# Patient Record
Sex: FEMALE | Race: WHITE | HISPANIC OR LATINO | Employment: FULL TIME | URBAN - METROPOLITAN AREA
[De-identification: names, ages, dates, MRNs, and addresses within clinical notes are randomized per-mention and may not be internally consistent; named-entity substitution may affect disease eponyms.]

---

## 2024-01-02 ENCOUNTER — APPOINTMENT (OUTPATIENT)
Dept: WOMENS IMAGING | Facility: HOSPITAL | Age: 34
End: 2024-01-02
Payer: MEDICAID

## 2024-01-02 PROCEDURE — 77066 DX MAMMO INCL CAD BI: CPT | Performed by: RADIOLOGY

## 2024-01-02 PROCEDURE — 76642 ULTRASOUND BREAST LIMITED: CPT | Performed by: RADIOLOGY

## 2024-01-02 PROCEDURE — G0279 TOMOSYNTHESIS, MAMMO: HCPCS | Performed by: RADIOLOGY

## 2024-01-02 PROCEDURE — 77062 BREAST TOMOSYNTHESIS BI: CPT | Performed by: RADIOLOGY

## 2024-01-12 DIAGNOSIS — N64.4 BREAST PAIN, LEFT: ICD-10-CM

## 2024-01-15 ENCOUNTER — LAB REQUISITION (OUTPATIENT)
Dept: LAB | Facility: HOSPITAL | Age: 34
End: 2024-01-15
Payer: COMMERCIAL

## 2024-01-15 ENCOUNTER — APPOINTMENT (OUTPATIENT)
Dept: WOMENS IMAGING | Facility: HOSPITAL | Age: 34
End: 2024-01-15
Payer: COMMERCIAL

## 2024-01-15 DIAGNOSIS — N63.0 UNSPECIFIED LUMP IN UNSPECIFIED BREAST: ICD-10-CM

## 2024-01-15 PROCEDURE — 88305 TISSUE EXAM BY PATHOLOGIST: CPT | Performed by: OBSTETRICS & GYNECOLOGY

## 2024-01-15 PROCEDURE — A4648 IMPLANTABLE TISSUE MARKER: HCPCS | Performed by: RADIOLOGY

## 2024-01-15 PROCEDURE — 19083 BX BREAST 1ST LESION US IMAG: CPT | Performed by: RADIOLOGY

## 2024-01-16 DIAGNOSIS — N63.14 MASS OF LOWER INNER QUADRANT OF RIGHT BREAST: ICD-10-CM

## 2024-01-17 ENCOUNTER — TELEPHONE (OUTPATIENT)
Dept: OBSTETRICS AND GYNECOLOGY | Facility: CLINIC | Age: 34
End: 2024-01-17

## 2024-01-18 LAB
DX PRELIMINARY: NORMAL
LAB AP CASE REPORT: NORMAL
PATH REPORT.FINAL DX SPEC: NORMAL
PATH REPORT.GROSS SPEC: NORMAL

## 2024-01-22 ENCOUNTER — TELEPHONE (OUTPATIENT)
Dept: OBSTETRICS AND GYNECOLOGY | Facility: CLINIC | Age: 34
End: 2024-01-22

## 2024-03-27 ENCOUNTER — OFFICE VISIT (OUTPATIENT)
Dept: OBSTETRICS AND GYNECOLOGY | Facility: CLINIC | Age: 34
End: 2024-03-27
Payer: COMMERCIAL

## 2024-03-27 VITALS
DIASTOLIC BLOOD PRESSURE: 72 MMHG | HEIGHT: 71 IN | SYSTOLIC BLOOD PRESSURE: 108 MMHG | WEIGHT: 193 LBS | BODY MASS INDEX: 27.02 KG/M2

## 2024-03-27 DIAGNOSIS — Z11.3 SCREEN FOR SEXUALLY TRANSMITTED DISEASES: Primary | ICD-10-CM

## 2024-03-27 DIAGNOSIS — N75.0 CYST OF LEFT BARTHOLIN'S GLAND: ICD-10-CM

## 2024-03-27 DIAGNOSIS — Z20.2 EXPOSURE TO STD: ICD-10-CM

## 2024-03-27 RX ORDER — CEPHALEXIN 500 MG/1
1 CAPSULE ORAL EVERY 6 HOURS
COMMUNITY
Start: 2024-03-16

## 2024-03-27 NOTE — PROGRESS NOTES
"Chief Complaint  Gynecologic Exam (Patient is here for consult about er visit (3/10 bartholin cyst))    Entirety of today's encounter including patient interview, exam, and counseling performed with the aid of a medical  via the telephone.     Subjective        Liana Perez presents to [facility]  History of Present Illness  Patient is here for follow-up from an emergency room visit.  She previously had seen my partner Dr. Rushing, but wished to switch care to me.  She apparently was seen at the [facility] on 3/10/2024.  We cannot view this visit in [facility] everywhere because the patient was registered under a different name.  We have reached out to [facility] to try to get records faxed to us.  She does have her visit summary from the encounter.  All I can see on the summary is that she was diagnosed with a Bartholin cyst and was given a prescription for cephalexin.  Patient reports that she had gone to the ER because she was having left-sided vulvar pain and swelling.  In the ER she says she was told that she had a Bartholin's abscess.  It was drained in the ER and she was started on cephalexin for 10 days.  The patient reports that symptoms have resolved in the interim.  She says she has never had issues like this in the past.    Patient also has concerns about a previous diagnosis of chlamydia.  She had reported that she tested positive for chlamydia in December 2023 \"at [facility]\".  She states she was treated at that time.  She reports her boyfriend was also treated but never got tested.  She wants more information about chlamydia infections.  Of note, I do not see any tests from the [facility] system; however, she does have testing done at the [facility] system.  It looks like her test for chlamydia was positive in August 2023.  Prior to that it had been negative and September 2023.  She also had a repeat test in October 2023 after being treated for " chlamydia, and that test was positive.  Patient states that she had been confused on the timing of the testing, and this was the only time that she had tested positive.    Menstrual History:  OB History          1    Para   1    Term   1            AB        Living   1         SAB        IAB        Ectopic        Molar        Multiple        Live Births   1               Patient's last menstrual period was 03/15/2024 (exact date).       Past Medical History:   Diagnosis Date    Chlamydia 2023     History reviewed. No pertinent surgical history.    Family History   Problem Relation Age of Onset    No Known Problems Father     No Known Problems Mother     No Known Problems Brother     No Known Problems Sister     Breast cancer Neg Hx     Colon cancer Neg Hx     Ovarian cancer Neg Hx     Uterine cancer Neg Hx      Social History     Tobacco Use    Smoking status: Never   Vaping Use    Vaping status: Never Used   Substance Use Topics    Alcohol use: Never    Drug use: Never     Current Outpatient Medications on File Prior to Visit   Medication Sig    cephalexin (KEFLEX) 500 MG capsule Take 1 capsule by mouth Every 6 (Six) Hours. (Patient not taking: Reported on 3/27/2024)     No current facility-administered medications on file prior to visit.     No Known Allergies    Review of systems:  Constitutional: No fevers, chills, sweats   Eye: No recent visual problems, denies blurry vision   HEENT: No ear pain, nasal congestion, sore throat, voice changes   Respiratory: No shortness of breath, cough, pain on breathing   Cardiovascular: No Chest pain, palpitations   Gastrointestinal: No nausea, vomiting, diarrhea, constipation   Genitourinary: No hematuria, dysuria, lesions on genitalia   Hema/Lymph: Negative for bruising, no edema   Endocrine: Negative for excessive thirst, excessive hunger, heat or cold intolerance   Musculoskeletal: No joint pain, muscle pain, decreased range of motion   Integumentary: No  "rash, pruritus, abrasions, lesions   Neurologic: No weakness, numbness, headaches   Psychiatric: No anxiety, depression, mood changes       Objective   Vital Signs:  /72   Ht 180.3 cm (70.98\")   Wt 87.5 kg (193 lb)   BMI 26.93 kg/m²   Estimated body mass index is 26.93 kg/m² as calculated from the following:    Height as of this encounter: 180.3 cm (70.98\").    Weight as of this encounter: 87.5 kg (193 lb).             Physical Exam   Gen: No acute distress, awake and oriented times three  Abdomen: soft, nontender, no masses or hernia, non distended, normoactive bowel sounds  Pelvic: Exam performed in the presence of a female chaperone  Patient has provided verbal consent to proceed with exam.  Normal external female genitalia, no lesions  At this time, there is no evidence of a Bartholin's gland cyst on either side.  Urethra: Normal meatus, no caruncle  Bladder: nontender  Vagina: No blood or discharge  Bimanual exam is deferred  External anal exam: Normal appearance, no lesions or hemorrhoids  Rectal: Deferred  Psych: Good judgement and insight, normal affect and mood    Result Review :    The following data was reviewed by: Indio Mo MD on 03/27/2024:    Data reviewed : Recent hospitalization notes er NOTES and lABS    10/11/2023:    Component  Ref Range & Units 5 mo ago Comments   CHLAMYDIA TRACHOMATIS RNA TMA, UROGENITAL (REFL)  NOT DETECTED NOT DETECTED    N gonorrhoeae, DNA Probe  NOT DETECTED NOT DETECTED      8/2/2023:  Component 7 mo ago   C. TRACHOMATIS DNA DETECTED   NEISSERIA GONORRHOEAE DNA NOT DETECTED     2/1/2023:  Component 1 yr ago   C. TRACHOMATIS DNA NOT DETECTED            Assessment and Plan     Diagnoses and all orders for this visit:    1. Screen for sexually transmitted diseases (Primary)  -     Chlamydia trachomatis, Neisseria gonorrhoeae, Trichomonas vaginalis, PCR - Swab, Cervix    2. Exposure to STD  -     Chlamydia trachomatis, Neisseria gonorrhoeae, Trichomonas " vaginalis, PCR - Swab, Cervix    I explained the nature of chlamydia infection to the patient.  We explained this is a sexually transmitted disease.  We discussed modes of transmission.  Safe sex practices encouraged.  Repeat testing encouraged today.  Recommended that her partner also be evaluated again to be sure that he is still negative.    3. Cyst of left Bartholin's gland - RESOLVED    No evidence of persistent Bartholin's gland cyst at this time.  We discussed the waxing/waning nature of the Bartholin's gland cyst.  Explained that at times may become larger than others.  We discussed signs of Bartholin's gland abscess.  Explained more long-term therapy for Bartholin's gland cyst including procedures such as marsupialization.  At this time since the exam is negative I would not recommend any further therapy unless it becomes more problematic.  She verbalized understanding.         Follow Up     Return in about 9 months (around 12/27/2024) for Annual physical.  Patient was given instructions and counseling regarding her condition or for health maintenance advice. Please see specific information pulled into the AVS if appropriate.

## 2024-03-29 LAB
C TRACH RRNA SPEC QL NAA+PROBE: NEGATIVE
N GONORRHOEA RRNA SPEC QL NAA+PROBE: NEGATIVE
T VAGINALIS RRNA SPEC QL NAA+PROBE: NEGATIVE

## 2024-07-12 ENCOUNTER — TELEPHONE (OUTPATIENT)
Dept: OBSTETRICS AND GYNECOLOGY | Facility: CLINIC | Age: 34
End: 2024-07-12
Payer: COMMERCIAL

## 2024-07-12 NOTE — TELEPHONE ENCOUNTER
Pt worked with [facility] and scheduled her 6 mth follow up [facility] for 7/15/24 @ 1030am.

## 2024-07-15 ENCOUNTER — APPOINTMENT (OUTPATIENT)
Dept: WOMENS IMAGING | Facility: HOSPITAL | Age: 34
End: 2024-07-15
Payer: COMMERCIAL

## 2024-07-15 PROCEDURE — 76642 ULTRASOUND BREAST LIMITED: CPT | Performed by: RADIOLOGY

## 2024-07-17 DIAGNOSIS — D24.1 FIBROADENOMA OF RIGHT BREAST: ICD-10-CM

## 2024-07-18 DIAGNOSIS — D24.1 FIBROADENOMA OF RIGHT BREAST: Primary | ICD-10-CM

## 2024-07-18 DIAGNOSIS — Z12.31 SCREENING MAMMOGRAM FOR BREAST CANCER: Primary | ICD-10-CM

## 2024-07-22 ENCOUNTER — TELEPHONE (OUTPATIENT)
Dept: SURGERY | Facility: CLINIC | Age: 34
End: 2024-07-22
Payer: COMMERCIAL

## 2024-07-29 ENCOUNTER — TELEPHONE (OUTPATIENT)
Dept: SURGERY | Facility: CLINIC | Age: 34
End: 2024-07-29
Payer: COMMERCIAL

## 2024-07-29 NOTE — TELEPHONE ENCOUNTER
Called the pt to set up her new PT appointment with .    New PT appointment: 08/20/2024 at 09:15 AM    PT gave a verbal understanding of appt date, time, and location.     Appt reminder and new PT paperwork sent out on 07/29/2024.    MEN

## 2024-08-13 ENCOUNTER — TELEPHONE (OUTPATIENT)
Dept: SURGERY | Facility: CLINIC | Age: 34
End: 2024-08-13
Payer: COMMERCIAL

## 2024-08-13 NOTE — TELEPHONE ENCOUNTER
I called PT to rescheuled her new PT appt with Dr. Mireles.    New PT appt: 08/20/2024 at 01:00 PM    PT gave a verbal understanding of appt date, time, and location.    Appt reminder sent out 08/13/2024    MEN

## 2024-08-19 NOTE — H&P (VIEW-ONLY)
[facility]     Referring Provider: Noah Rushing,*     Chief complaint: breast mass     Subjective   HPI: Ms. Liana Perez is a 34 y.o. yo woman, seen at the request of Noah Rushing,*, for a new diagnosis of right breast fibroadenoma..      She notes the lesion has been present for approximately 1 year.  It has increased in size on her personal exam.  She underwent diagnostic imaging and biopsy which revealed her initial diagnosis of fibroadenoma.  Follow-up imaging shows grossly stable mass in the right lower inner quadrant of her breast.  She notes it is painful from time to time but notices it mostly when she has a cough.    She denies any prior history of abnormal mammograms or breast biopsies.     She reports she is otherwise healthy but takes a baby aspirin every day as recommended by her mother for circulation.     She denies any family history of breast or ovarian cancer.     She was by herself today in clinic.  The visit was assisted in person , [id number] [id number].         Review of Systems   Constitutional:  Positive for unexpected weight change (weight gain). Negative for appetite change, fatigue and fever.   HENT:  Negative.     Eyes: Negative.    Respiratory: Negative.  Negative for cough and shortness of breath.    Cardiovascular: Negative.    Gastrointestinal:  Positive for constipation and nausea. Negative for abdominal distention and diarrhea.   Endocrine: Negative.    Genitourinary:  Positive for pelvic pain.    Musculoskeletal:  Positive for arthralgias, back pain, flank pain, gait problem and myalgias.   Skin: Negative.    Neurological:  Positive for gait problem. Negative for dizziness, headaches and speech difficulty.   Hematological: Negative.    Psychiatric/Behavioral:  Negative for decreased concentration and sleep disturbance. The patient is nervous/anxious.        Medications:    Current Outpatient Medications:     aspirin 81 MG chewable tablet, Chew 1  "tablet Daily., Disp: , Rfl:     cephalexin (KEFLEX) 500 MG capsule, Take 1 capsule by mouth Every 6 (Six) Hours. (Patient not taking: Reported on 3/27/2024), Disp: , Rfl:     Allergies:  No Known Allergies    Medical history:  Past Medical History:   Diagnosis Date    Chlamydia 2023       Surgical History:  History reviewed. No pertinent surgical history.    Family History:  Family History   Problem Relation Age of Onset    No Known Problems Father     No Known Problems Mother     No Known Problems Brother     No Known Problems Sister     Breast cancer Neg Hx     Colon cancer Neg Hx     Ovarian cancer Neg Hx     Uterine cancer Neg Hx        Cancer Family History: Age of diagnosis/Age at death OR Age as of today  Breast Cancer: Denies  Ovarian Cancer: Denies  Pancreatic Cancer: Denies  Prostate Cancer: Denies  Colon Cancer: Denies  Lung Cancer: Denies    Social History:   Social History     Socioeconomic History    Marital status:    Tobacco Use    Smoking status: Never   Vaping Use    Vaping status: Never Used   Substance and Sexual Activity    Alcohol use: Yes     Comment: now and then, once a week    Drug use: Never    Sexual activity: Yes     Birth control/protection: Nexplanon       She lives with her , child, sister, niece.   She is employed as a [employer], works with [employer].       GYNECOLOGIC HISTORY:   . P: 1. AB:  0.  Last menstrual period: 2024  Age at menarche: 11  Age at first childbirth: 33   Lactation: 1 week  Age at menopause: N/A  Total years of oral contraceptive use: 1  Total years of hormone replacement therapy: 0      Objective   PHYSICAL EXAMINATION  This exam was chaperoned by: Millicent   /78   Pulse 81   Resp 18   Ht 180.3 cm (70.98\")   Wt 87.5 kg (193 lb)   SpO2 97%   BMI 26.93 kg/m²   ECOG 0 - Asymptomatic  General: NAD, well appearing  Psych: a&o x 3, normal mood and affect  Eyes: EOMI, no scleral icterus  ENMT: neck supple without masses or thyromegaly, mucus " membranes moist  Resp: normal effort  CV: RRR, no edema   GI: soft, NT, ND  MSK: normal gait, normal ROM in bilateral shoulders  Lymph nodes:  no cervical, supraclavicular or axillary lymphadenopathy  Breast: symmetric, small breast volume, minimal ptosis  Right: Approximately 1 cm deep to the skin and lower inner quadrant at approximately 430 o'clock mobile firm 2 cm oblong mass, no visible abnormalities on inspection while seated, with arms raised or hands on hips. No skin changes, or nipple abnormalities.  Left:  No visible abnormalities on inspection while seated, with arms raised or hands on hips. No masses, skin changes, or nipple abnormalities.      Right breast, in-office ultrasound: 430 o'clock approximately 45 cm from the nipple there is an oblong well-circumscribed mass approximately half a centimeter deep to the skin       Imaging - documented images below have been personally reviewed:  1-24 bilateral diagnostic mammogram  Breast are extremely dense  Finding 1 no radiographic abnormality in the region the pain in the left breast  Finding 2 isodense oval mass with circumscribed margins in the posterior one third 430 o'clock region the right breast mass correlates to palpable abnormality at the 430 o'clock region  Bilateral ultrasound  Finding 1 no sonographic correlate  Finding 2 ultrasound demonstrates an oval parallel hypoechoic mass with circumscribed margins measuring 23 x 12 x 23 mm.  This finding is most consistent with a fibroadenoma  Impression  Finding 1-no sonographic evidence of malignancy  Finding 2 mass in the right breast is suspicious biopsy is recommended  BI-RADS 4A    1/15/2024 right breast biopsy ultrasound-guided biopsy performed for the mass in the right breast at 430 o'clock.  Using a 14-gauge biopsy needle 5 cores were obtained.  A mini cork shaped tissue marker was placed in the biopsy site.  Specimen was placed in formalin postprocedure mammogram shows biopsy clip in satisfactory  position  Impression  Pathology shows fibroadenoma pathology is concordant.  Recommend follow-up in 6 months with ultrasound    7/15/2024 right breast ultrasound  Follow-up exam for the oval mass in the right breast 430 o'clock seen 1-24, on present exam there is an oval parallel hypoechoic mass with circumscribed margins measuring 23 x 12 x 23 mm.  No significant change from prior exam.  This finding represents a biopsy-proven fibroadenoma  Impression mass in the right breast is benign negative biopsy-proven fibroadenoma is unchanged if patient desires excision for comfort surgical consultation may be considered.  BI-RADS 2    Pathology:  1/15/24  1. Right Breast, 4:30, Ultrasound-Guided Core Needle Biopsy for a Mass:               A. Fragments of fibroadenoma.    Assessment & Plan   Assessment:  34 y.o. F with a new diagnosis of painful, right breast fibroadenoma.     Discussion:  A right breast fibroadenoma discovered by exam for palpable mass. Discussed with patient at length the diagnosis of fibroadenoma, the clinical implications, and the clinical management of the process. Noted that this lesion is benign, and indications for surgical resection would include increasing size, symptoms, or concerning findings on surveillance imaging. Suspicious findings on imaging, greater than 3 cm or growing excision may be indicated as it can reviel a phylloides tumor. Discussed that surveillance imaging would be short term, strictly to ensure that there are no increases in size or suspicious changes. Noted that there is no increased risk of future malignancy linked to the diagnosis of fibroadenoma. Patient appeared to understand and all questions were answered.       Plan:    - OR for excisional biopsy of right breast fibroadenoma due to pain        Zenaida Mireles MD  Breast Surgical Oncology    I spent 60 minutes caring for Liana on this date of service. This time includes time spent by me in the following activities:  preparing for the visit, reviewing tests, obtaining and/or reviewing a separately obtained history, performing a medically appropriate examination and/or evaluation , counseling and educating the patient/family/caregiver, ordering medications, tests, or procedures, referring and communicating with other health care professionals , documenting information in the medical record, independently interpreting results and communicating that information with the patient/family/caregiver, and care coordination.      CC:  Noah Rushing,*

## 2024-08-19 NOTE — PROGRESS NOTES
[facility]     Referring Provider: Noah Rushing,*     Chief complaint: breast mass     Subjective   HPI: Ms. Liana Perez is a 34 y.o. yo woman, seen at the request of Noah Rushing,*, for a new diagnosis of right breast fibroadenoma..      She notes the lesion has been present for approximately 1 year.  It has increased in size on her personal exam.  She underwent diagnostic imaging and biopsy which revealed her initial diagnosis of fibroadenoma.  Follow-up imaging shows grossly stable mass in the right lower inner quadrant of her breast.  She notes it is painful from time to time but notices it mostly when she has a cough.    She denies any prior history of abnormal mammograms or breast biopsies.     She reports she is otherwise healthy but takes a baby aspirin every day as recommended by her mother for circulation.     She denies any family history of breast or ovarian cancer.     She was by herself today in clinic.  The visit was assisted in person , Jaspal [id number].         Review of Systems   Constitutional:  Positive for unexpected weight change (weight gain). Negative for appetite change, fatigue and fever.   HENT:  Negative.     Eyes: Negative.    Respiratory: Negative.  Negative for cough and shortness of breath.    Cardiovascular: Negative.    Gastrointestinal:  Positive for constipation and nausea. Negative for abdominal distention and diarrhea.   Endocrine: Negative.    Genitourinary:  Positive for pelvic pain.    Musculoskeletal:  Positive for arthralgias, back pain, flank pain, gait problem and myalgias.   Skin: Negative.    Neurological:  Positive for gait problem. Negative for dizziness, headaches and speech difficulty.   Hematological: Negative.    Psychiatric/Behavioral:  Negative for decreased concentration and sleep disturbance. The patient is nervous/anxious.        Medications:    Current Outpatient Medications:     aspirin 81 MG chewable tablet, Chew 1  "tablet Daily., Disp: , Rfl:     cephalexin (KEFLEX) 500 MG capsule, Take 1 capsule by mouth Every 6 (Six) Hours. (Patient not taking: Reported on 3/27/2024), Disp: , Rfl:     Allergies:  No Known Allergies    Medical history:  Past Medical History:   Diagnosis Date    Chlamydia 2023       Surgical History:  History reviewed. No pertinent surgical history.    Family History:  Family History   Problem Relation Age of Onset    No Known Problems Father     No Known Problems Mother     No Known Problems Brother     No Known Problems Sister     Breast cancer Neg Hx     Colon cancer Neg Hx     Ovarian cancer Neg Hx     Uterine cancer Neg Hx        Cancer Family History: Age of diagnosis/Age at death OR Age as of today  Breast Cancer: Denies  Ovarian Cancer: Denies  Pancreatic Cancer: Denies  Prostate Cancer: Denies  Colon Cancer: Denies  Lung Cancer: Denies    Social History:   Social History     Socioeconomic History    Marital status:    Tobacco Use    Smoking status: Never   Vaping Use    Vaping status: Never Used   Substance and Sexual Activity    Alcohol use: Yes     Comment: now and then, once a week    Drug use: Never    Sexual activity: Yes     Birth control/protection: Nexplanon       She lives with her , child, sister, niece.   She is employed as a [employer], works with [employer].       GYNECOLOGIC HISTORY:   . P: 1. AB:  0.  Last menstrual period: 2024  Age at menarche: 11  Age at first childbirth: 33   Lactation: 1 week  Age at menopause: N/A  Total years of oral contraceptive use: 1  Total years of hormone replacement therapy: 0      Objective   PHYSICAL EXAMINATION  This exam was chaperoned by: Millicent   /78   Pulse 81   Resp 18   Ht 180.3 cm (70.98\")   Wt 87.5 kg (193 lb)   SpO2 97%   BMI 26.93 kg/m²   ECOG 0 - Asymptomatic  General: NAD, well appearing  Psych: a&o x 3, normal mood and affect  Eyes: EOMI, no scleral icterus  ENMT: neck supple without masses or thyromegaly, mucus " membranes moist  Resp: normal effort  CV: RRR, no edema   GI: soft, NT, ND  MSK: normal gait, normal ROM in bilateral shoulders  Lymph nodes:  no cervical, supraclavicular or axillary lymphadenopathy  Breast: symmetric, small breast volume, minimal ptosis  Right: Approximately 1 cm deep to the skin and lower inner quadrant at approximately 430 o'clock mobile firm 2 cm oblong mass, no visible abnormalities on inspection while seated, with arms raised or hands on hips. No skin changes, or nipple abnormalities.  Left:  No visible abnormalities on inspection while seated, with arms raised or hands on hips. No masses, skin changes, or nipple abnormalities.      Right breast, in-office ultrasound: 430 o'clock approximately 45 cm from the nipple there is an oblong well-circumscribed mass approximately half a centimeter deep to the skin       Imaging - documented images below have been personally reviewed:  1-24 bilateral diagnostic mammogram  Breast are extremely dense  Finding 1 no radiographic abnormality in the region the pain in the left breast  Finding 2 isodense oval mass with circumscribed margins in the posterior one third 430 o'clock region the right breast mass correlates to palpable abnormality at the 430 o'clock region  Bilateral ultrasound  Finding 1 no sonographic correlate  Finding 2 ultrasound demonstrates an oval parallel hypoechoic mass with circumscribed margins measuring 23 x 12 x 23 mm.  This finding is most consistent with a fibroadenoma  Impression  Finding 1-no sonographic evidence of malignancy  Finding 2 mass in the right breast is suspicious biopsy is recommended  BI-RADS 4A    1/15/2024 right breast biopsy ultrasound-guided biopsy performed for the mass in the right breast at 430 o'clock.  Using a 14-gauge biopsy needle 5 cores were obtained.  A mini cork shaped tissue marker was placed in the biopsy site.  Specimen was placed in formalin postprocedure mammogram shows biopsy clip in satisfactory  position  Impression  Pathology shows fibroadenoma pathology is concordant.  Recommend follow-up in 6 months with ultrasound    7/15/2024 right breast ultrasound  Follow-up exam for the oval mass in the right breast 430 o'clock seen 1-24, on present exam there is an oval parallel hypoechoic mass with circumscribed margins measuring 23 x 12 x 23 mm.  No significant change from prior exam.  This finding represents a biopsy-proven fibroadenoma  Impression mass in the right breast is benign negative biopsy-proven fibroadenoma is unchanged if patient desires excision for comfort surgical consultation may be considered.  BI-RADS 2    Pathology:  1/15/24  1. Right Breast, 4:30, Ultrasound-Guided Core Needle Biopsy for a Mass:               A. Fragments of fibroadenoma.    Assessment & Plan   Assessment:  34 y.o. F with a new diagnosis of painful, right breast fibroadenoma.     Discussion:  A right breast fibroadenoma discovered by exam for palpable mass. Discussed with patient at length the diagnosis of fibroadenoma, the clinical implications, and the clinical management of the process. Noted that this lesion is benign, and indications for surgical resection would include increasing size, symptoms, or concerning findings on surveillance imaging. Suspicious findings on imaging, greater than 3 cm or growing excision may be indicated as it can reviel a phylloides tumor. Discussed that surveillance imaging would be short term, strictly to ensure that there are no increases in size or suspicious changes. Noted that there is no increased risk of future malignancy linked to the diagnosis of fibroadenoma. Patient appeared to understand and all questions were answered.       Plan:    - OR for excisional biopsy of right breast fibroadenoma due to pain        Zenaida Mireles MD  Breast Surgical Oncology    I spent 60 minutes caring for Liana on this date of service. This time includes time spent by me in the following activities:  preparing for the visit, reviewing tests, obtaining and/or reviewing a separately obtained history, performing a medically appropriate examination and/or evaluation , counseling and educating the patient/family/caregiver, ordering medications, tests, or procedures, referring and communicating with other health care professionals , documenting information in the medical record, independently interpreting results and communicating that information with the patient/family/caregiver, and care coordination.      CC:  Noah Rushing,*

## 2024-08-20 ENCOUNTER — OFFICE VISIT (OUTPATIENT)
Dept: SURGERY | Facility: CLINIC | Age: 34
End: 2024-08-20
Payer: COMMERCIAL

## 2024-08-20 ENCOUNTER — PREP FOR SURGERY (OUTPATIENT)
Dept: OTHER | Facility: HOSPITAL | Age: 34
End: 2024-08-20
Payer: COMMERCIAL

## 2024-08-20 ENCOUNTER — HOSPITAL ENCOUNTER (OUTPATIENT)
Dept: SURGERY | Facility: HOSPITAL | Age: 34
Discharge: HOME | End: 2024-08-20
Payer: COMMERCIAL

## 2024-08-20 VITALS
HEART RATE: 81 BPM | BODY MASS INDEX: 27.02 KG/M2 | HEIGHT: 71 IN | RESPIRATION RATE: 18 BRPM | WEIGHT: 193 LBS | SYSTOLIC BLOOD PRESSURE: 126 MMHG | OXYGEN SATURATION: 97 % | DIASTOLIC BLOOD PRESSURE: 78 MMHG

## 2024-08-20 DIAGNOSIS — D24.1 BREAST FIBROADENOMA IN FEMALE, RIGHT: Primary | ICD-10-CM

## 2024-08-20 DIAGNOSIS — R92.8 ABNORMAL FINDING ON BREAST IMAGING: ICD-10-CM

## 2024-08-20 PROCEDURE — 99205 OFFICE O/P NEW HI 60 MIN: CPT | Performed by: STUDENT IN AN ORGANIZED HEALTH CARE EDUCATION/TRAINING PROGRAM

## 2024-08-20 RX ORDER — ASPIRIN 81 MG/1
81 TABLET, CHEWABLE ORAL DAILY
COMMUNITY

## 2024-08-20 NOTE — LETTER
August 20, 2024     Noah Rushing MD  [address]  [address]  [place] [id number]    Patient: Liana Perez   YOB: 1990   Date of Visit: 8/20/2024     Dear Noah Rushing MD:       Thank you for referring Liana Perez to me for evaluation. Below are the relevant portions of my assessment and plan of care.    If you have questions, please do not hesitate to call me. I look forward to following Liana along with you.         Sincerely,        Zenaida Mireles MD        CC: No Recipients    Zenaida Mireles MD  08/20/24 1512  Sign when Signing Visit  [facility]     Referring Provider: Noah Rushing,*     Chief complaint: breast mass     Subjective  HPI: Ms. Liana Perez is a 34 y.o. yo woman, seen at the request of Noah Rushing,*, for a new diagnosis of right breast fibroadenoma..      She notes the lesion has been present for approximately 1 year.  It has increased in size on her personal exam.  She underwent diagnostic imaging and biopsy which revealed her initial diagnosis of fibroadenoma.  Follow-up imaging shows grossly stable mass in the right lower inner quadrant of her breast.  She notes it is painful from time to time but notices it mostly when she has a cough.    She denies any prior history of abnormal mammograms or breast biopsies.     She reports she is otherwise healthy but takes a baby aspirin every day as recommended by her mother for circulation.     She denies any family history of breast or ovarian cancer.     She was by herself today in clinic.  The visit was assisted in person , Jaspal [id number].         Review of Systems   Constitutional:  Positive for unexpected weight change (weight gain). Negative for appetite change, fatigue and fever.   HENT:  Negative.     Eyes: Negative.    Respiratory: Negative.  Negative for cough and shortness of breath.    Cardiovascular: Negative.    Gastrointestinal:  Positive for  constipation and nausea. Negative for abdominal distention and diarrhea.   Endocrine: Negative.    Genitourinary:  Positive for pelvic pain.    Musculoskeletal:  Positive for arthralgias, back pain, flank pain, gait problem and myalgias.   Skin: Negative.    Neurological:  Positive for gait problem. Negative for dizziness, headaches and speech difficulty.   Hematological: Negative.    Psychiatric/Behavioral:  Negative for decreased concentration and sleep disturbance. The patient is nervous/anxious.        Medications:    Current Outpatient Medications:   •  aspirin 81 MG chewable tablet, Chew 1 tablet Daily., Disp: , Rfl:   •  cephalexin (KEFLEX) 500 MG capsule, Take 1 capsule by mouth Every 6 (Six) Hours. (Patient not taking: Reported on 3/27/2024), Disp: , Rfl:     Allergies:  No Known Allergies    Medical history:  Past Medical History:   Diagnosis Date   • Chlamydia 08/2023       Surgical History:  History reviewed. No pertinent surgical history.    Family History:  Family History   Problem Relation Age of Onset   • No Known Problems Father    • No Known Problems Mother    • No Known Problems Brother    • No Known Problems Sister    • Breast cancer Neg Hx    • Colon cancer Neg Hx    • Ovarian cancer Neg Hx    • Uterine cancer Neg Hx        Cancer Family History: Age of diagnosis/Age at death OR Age as of today  Breast Cancer: Denies  Ovarian Cancer: Denies  Pancreatic Cancer: Denies  Prostate Cancer: Denies  Colon Cancer: Denies  Lung Cancer: Denies    Social History:   Social History     Socioeconomic History   • Marital status:    Tobacco Use   • Smoking status: Never   Vaping Use   • Vaping status: Never Used   Substance and Sexual Activity   • Alcohol use: Yes     Comment: now and then, once a week   • Drug use: Never   • Sexual activity: Yes     Birth control/protection: Nexplanon       She lives with her , child, sister, niece.   She is employed as a [employer], works with [employer].    "    GYNECOLOGIC HISTORY:   . P: 1. AB:  0.  Last menstrual period: 2024  Age at menarche: 11  Age at first childbirth: 33   Lactation: 1 week  Age at menopause: N/A  Total years of oral contraceptive use: 1  Total years of hormone replacement therapy: 0      Objective  PHYSICAL EXAMINATION  This exam was chaperoned by: Millicent   /78   Pulse 81   Resp 18   Ht 180.3 cm (70.98\")   Wt 87.5 kg (193 lb)   SpO2 97%   BMI 26.93 kg/m²   ECOG 0 - Asymptomatic  General: NAD, well appearing  Psych: a&o x 3, normal mood and affect  Eyes: EOMI, no scleral icterus  ENMT: neck supple without masses or thyromegaly, mucus membranes moist  Resp: normal effort  CV: RRR, no edema   GI: soft, NT, ND  MSK: normal gait, normal ROM in bilateral shoulders  Lymph nodes:  no cervical, supraclavicular or axillary lymphadenopathy  Breast: symmetric, small breast volume, minimal ptosis  Right: Approximately 1 cm deep to the skin and lower inner quadrant at approximately 430 o'clock mobile firm 2 cm oblong mass, no visible abnormalities on inspection while seated, with arms raised or hands on hips. No skin changes, or nipple abnormalities.  Left:  No visible abnormalities on inspection while seated, with arms raised or hands on hips. No masses, skin changes, or nipple abnormalities.      Right breast, in-office ultrasound: 430 o'clock approximately 45 cm from the nipple there is an oblong well-circumscribed mass approximately half a centimeter deep to the skin       Imaging - documented images below have been personally reviewed:  1-24 bilateral diagnostic mammogram  Breast are extremely dense  Finding 1 no radiographic abnormality in the region the pain in the left breast  Finding 2 isodense oval mass with circumscribed margins in the posterior one third 430 o'clock region the right breast mass correlates to palpable abnormality at the 430 o'clock region  Bilateral ultrasound  Finding 1 no sonographic correlate  Finding 2 " ultrasound demonstrates an oval parallel hypoechoic mass with circumscribed margins measuring 23 x 12 x 23 mm.  This finding is most consistent with a fibroadenoma  Impression  Finding 1-no sonographic evidence of malignancy  Finding 2 mass in the right breast is suspicious biopsy is recommended  BI-RADS 4A    1/15/2024 right breast biopsy ultrasound-guided biopsy performed for the mass in the right breast at 430 o'clock.  Using a 14-gauge biopsy needle 5 cores were obtained.  A mini cork shaped tissue marker was placed in the biopsy site.  Specimen was placed in formalin postprocedure mammogram shows biopsy clip in satisfactory position  Impression  Pathology shows fibroadenoma pathology is concordant.  Recommend follow-up in 6 months with ultrasound    7/15/2024 right breast ultrasound  Follow-up exam for the oval mass in the right breast 430 o'clock seen 1-24, on present exam there is an oval parallel hypoechoic mass with circumscribed margins measuring 23 x 12 x 23 mm.  No significant change from prior exam.  This finding represents a biopsy-proven fibroadenoma  Impression mass in the right breast is benign negative biopsy-proven fibroadenoma is unchanged if patient desires excision for comfort surgical consultation may be considered.  BI-RADS 2    Pathology:  1/15/24  1. Right Breast, 4:30, Ultrasound-Guided Core Needle Biopsy for a Mass:               A. Fragments of fibroadenoma.    Assessment & Plan  Assessment:  34 y.o. F with a new diagnosis of painful, right breast fibroadenoma.     Discussion:  A right breast fibroadenoma discovered by exam for palpable mass. Discussed with patient at length the diagnosis of fibroadenoma, the clinical implications, and the clinical management of the process. Noted that this lesion is benign, and indications for surgical resection would include increasing size, symptoms, or concerning findings on surveillance imaging. Suspicious findings on imaging, greater than 3 cm or  growing excision may be indicated as it can reviel a phylloides tumor. Discussed that surveillance imaging would be short term, strictly to ensure that there are no increases in size or suspicious changes. Noted that there is no increased risk of future malignancy linked to the diagnosis of fibroadenoma. Patient appeared to understand and all questions were answered.       Plan:    - OR for excisional biopsy of right breast fibroadenoma due to pain        Zenaida Mireles MD  Breast Surgical Oncology    I spent 60 minutes caring for Liana on this date of service. This time includes time spent by me in the following activities: preparing for the visit, reviewing tests, obtaining and/or reviewing a separately obtained history, performing a medically appropriate examination and/or evaluation , counseling and educating the patient/family/caregiver, ordering medications, tests, or procedures, referring and communicating with other health care professionals , documenting information in the medical record, independently interpreting results and communicating that information with the patient/family/caregiver, and care coordination.      CC:  Noah Rushing,*

## 2024-08-21 ENCOUNTER — TELEPHONE (OUTPATIENT)
Dept: SURGERY | Facility: CLINIC | Age: 34
End: 2024-08-21
Payer: COMMERCIAL

## 2024-08-21 PROBLEM — D24.1 BREAST FIBROADENOMA IN FEMALE, RIGHT: Status: ACTIVE | Noted: 2024-08-20

## 2024-08-21 NOTE — TELEPHONE ENCOUNTER
Called PT with the following appts and surgery with Dr. Mireles.    PATs: 08/26/24 at 03:00 PM    Surgery with Dr. Mireles: 09/05/2024  Arrive: 06:00 AM  Start: 08:00 AM    Post-op: 09/20/2024 at 08:45 AM    PT gave a verbal understanding of appt dates, times, and locations.    Surgery packet sent out on 08/21/2024    MEN

## 2024-08-22 ENCOUNTER — PATIENT ROUNDING (BHMG ONLY) (OUTPATIENT)
Dept: SURGERY | Facility: CLINIC | Age: 34
End: 2024-08-22
Payer: COMMERCIAL

## 2024-08-22 NOTE — PROGRESS NOTES
August 22, 2024    Hello, may I speak with Liana Perez?    My name is Asha      I am  with [facility]  [address] [address] [phone].    Before we get started may I verify your date of birth? 1990    I am calling to officially welcome you to our practice and ask about your recent visit. Is this a good time to talk? No left message to call back    Tell me about your visit with us. What things went well?         We're always looking for ways to make our patients' experiences even better. Do you have recommendations on ways we may improve?      Overall were you satisfied with your first visit to our practice?        I appreciate you taking the time to speak with me today. Is there anything else I can do for you?       Thank you, and have a great day.

## 2024-08-26 ENCOUNTER — PRE-ADMISSION TESTING (OUTPATIENT)
Dept: PREADMISSION TESTING | Facility: HOSPITAL | Age: 34
End: 2024-08-26
Payer: COMMERCIAL

## 2024-08-26 VITALS
SYSTOLIC BLOOD PRESSURE: 95 MMHG | HEIGHT: 71 IN | OXYGEN SATURATION: 99 % | RESPIRATION RATE: 16 BRPM | DIASTOLIC BLOOD PRESSURE: 62 MMHG | HEART RATE: 67 BPM | TEMPERATURE: 98.7 F | WEIGHT: 194.5 LBS | BODY MASS INDEX: 27.23 KG/M2

## 2024-08-26 DIAGNOSIS — D24.1 BREAST FIBROADENOMA IN FEMALE, RIGHT: ICD-10-CM

## 2024-08-26 LAB
ANION GAP SERPL CALCULATED.3IONS-SCNC: 10.7 MMOL/L (ref 5–15)
BASOPHILS # BLD AUTO: 0.04 10*3/MM3 (ref 0–0.2)
BASOPHILS NFR BLD AUTO: 0.4 % (ref 0–1.5)
BUN SERPL-MCNC: 9 MG/DL (ref 6–20)
BUN/CREAT SERPL: 13.4 (ref 7–25)
CALCIUM SPEC-SCNC: 9.2 MG/DL (ref 8.6–10.5)
CHLORIDE SERPL-SCNC: 103 MMOL/L (ref 98–107)
CO2 SERPL-SCNC: 25.3 MMOL/L (ref 22–29)
CREAT SERPL-MCNC: 0.67 MG/DL (ref 0.57–1)
DEPRECATED RDW RBC AUTO: 40.2 FL (ref 37–54)
EGFRCR SERPLBLD CKD-EPI 2021: 117.1 ML/MIN/1.73
EOSINOPHIL # BLD AUTO: 0.14 10*3/MM3 (ref 0–0.4)
EOSINOPHIL NFR BLD AUTO: 1.5 % (ref 0.3–6.2)
ERYTHROCYTE [DISTWIDTH] IN BLOOD BY AUTOMATED COUNT: 12.5 % (ref 12.3–15.4)
GLUCOSE SERPL-MCNC: 96 MG/DL (ref 65–99)
HCG SERPL QL: NEGATIVE
HCT VFR BLD AUTO: 38.6 % (ref 34–46.6)
HGB BLD-MCNC: 13 G/DL (ref 12–15.9)
IMM GRANULOCYTES # BLD AUTO: 0.03 10*3/MM3 (ref 0–0.05)
IMM GRANULOCYTES NFR BLD AUTO: 0.3 % (ref 0–0.5)
LYMPHOCYTES # BLD AUTO: 2.07 10*3/MM3 (ref 0.7–3.1)
LYMPHOCYTES NFR BLD AUTO: 22.6 % (ref 19.6–45.3)
MCH RBC QN AUTO: 29.5 PG (ref 26.6–33)
MCHC RBC AUTO-ENTMCNC: 33.7 G/DL (ref 31.5–35.7)
MCV RBC AUTO: 87.5 FL (ref 79–97)
MONOCYTES # BLD AUTO: 0.5 10*3/MM3 (ref 0.1–0.9)
MONOCYTES NFR BLD AUTO: 5.5 % (ref 5–12)
NEUTROPHILS NFR BLD AUTO: 6.37 10*3/MM3 (ref 1.7–7)
NEUTROPHILS NFR BLD AUTO: 69.7 % (ref 42.7–76)
NRBC BLD AUTO-RTO: 0 /100 WBC (ref 0–0.2)
PLATELET # BLD AUTO: 211 10*3/MM3 (ref 140–450)
PMV BLD AUTO: 10.4 FL (ref 6–12)
POTASSIUM SERPL-SCNC: 4.1 MMOL/L (ref 3.5–5.2)
RBC # BLD AUTO: 4.41 10*6/MM3 (ref 3.77–5.28)
SODIUM SERPL-SCNC: 139 MMOL/L (ref 136–145)
WBC NRBC COR # BLD AUTO: 9.15 10*3/MM3 (ref 3.4–10.8)

## 2024-08-26 PROCEDURE — 84703 CHORIONIC GONADOTROPIN ASSAY: CPT

## 2024-08-26 PROCEDURE — 80048 BASIC METABOLIC PNL TOTAL CA: CPT

## 2024-08-26 PROCEDURE — 85025 COMPLETE CBC W/AUTO DIFF WBC: CPT

## 2024-08-26 PROCEDURE — 36415 COLL VENOUS BLD VENIPUNCTURE: CPT

## 2024-08-26 NOTE — DISCHARGE INSTRUCTIONS
Take the following medications the morning of surgery: NONE      If you are on prescription narcotic pain medication to control your pain you may also take that medication the morning of surgery.      General Instructions:     Do not eat solid food after midnight the night before surgery.  Clear liquids day of surgery are allowed but must be stopped at least two hours before your hospital arrival time.       Allowed clear liquids      Water, sodas, and tea or coffee with no cream or milk added.       12 to 20 ounces of a clear liquid that contains carbohydrates is recommended.  If non-diabetic, have Gatorade or Powerade.  If diabetic, have G2 or Powerade Zero.     Do not have liquids red in color.  Do not consume chicken, beef, pork or vegetable broth or bouillon cubes of any variety as they are not considered clear liquids and are not allowed.      Patients who avoid smoking, chewing tobacco and alcohol for 4 weeks prior to surgery have a reduced risk of post-operative complications.  Quit smoking as many days before surgery as you can.  Do not smoke, use chewing tobacco or drink alcohol the day of surgery.   If applicable bring your C-PAP/ BI-PAP machine in with you to preop day of surgery.  Bring any papers given to you in the doctor’s office.  Wear clean comfortable clothes.  Do not wear contact lenses, false eyelashes or make-up.  Bring a case for your glasses.   Bring crutches or walker if applicable.  Remove all piercings.  Leave jewelry and any other valuables at home.  Hair extensions with metal clips must be removed prior to surgery.  The Pre-Admission Testing nurse will instruct you to bring medications if unable to obtain an accurate list in Pre-Admission Testing.            Preventing a Surgical Site Infection:  For 2 to 3 days before surgery, avoid shaving with a razor because the razor can irritate skin and make it easier to develop an infection.    Any areas of open skin can increase the risk of a  post-operative wound infection by allowing bacteria to enter and travel throughout the body.  Notify your surgeon if you have any skin wounds / rashes even if it is not near the expected surgical site.  The area will need assessed to determine if surgery should be delayed until it is healed.  The night prior to surgery shower using a fresh bar of anti-bacterial soap (such as Dial) and clean washcloth.  Sleep in a clean bed with clean clothing.  Do not allow pets to sleep with you.  Shower on the morning of surgery using a fresh bar of anti-bacterial soap (such as Dial) and clean washcloth.  Dry with a clean towel and dress in clean clothing.  Ask your surgeon if you will be receiving antibiotics prior to surgery.  Make sure you, your family, and all healthcare providers clean their hands with soap and water or an alcohol based hand  before caring for you or your wound.    Day of surgery:  Your arrival time is approximately two hours before your scheduled surgery time.  Please note if you have an early arrival time the surgery doors do not open before 5:00 AM.  Upon arrival, a Pre-op nurse and Anesthesiologist will review your health history, obtain vital signs, and answer questions you may have.  The only belongings needed at this time will be a list of your home medications and if applicable your C-PAP/BI-PAP machine.  A Pre-op nurse will start an IV and you may receive medication in preparation for surgery, including something to help you relax.     Please be aware that surgery does come with discomfort.  We want to make every effort to control your discomfort so please discuss any uncontrolled symptoms with your nurse.   Your doctor will most likely have prescribed pain medications.      If you are going home after surgery you will receive individualized written care instructions before being discharged.  A responsible adult must drive you to and from the hospital on the day of your surgery and ideally  stay with you through the night.   .  Discharge prescriptions can be filled by the hospital pharmacy during regular pharmacy hours.  If you are having surgery late in the day/evening your prescription may be e-prescribed to your pharmacy.  Please verify your pharmacy hours or chose a 24 hour pharmacy to avoid not having access to your prescription because your pharmacy has closed for the day.    If you are staying overnight following surgery, you will be transported to your hospital room following the recovery period.  [facility] has all private rooms.    If you have any questions please call Pre-Admission Testing at [phone].  Deductibles and co-payments are collected on the day of service. Please be prepared to pay the required co-pay, deductible or deposit on the day of service as defined by your plan.    Call your surgeon immediately if you experience any of the following symptoms:  Sore Throat  Shortness of Breath or difficulty breathing  Cough  Chills  Body soreness or muscle pain  Headache  Fever  New loss of taste or smell  Do not arrive for your surgery ill.  Your procedure will need to be rescheduled to another time.  You will need to call your physician before the day of surgery to avoid any unnecessary exposure to hospital staff as well as other patients.

## 2024-09-05 ENCOUNTER — HOSPITAL ENCOUNTER (OUTPATIENT)
Facility: HOSPITAL | Age: 34
Setting detail: HOSPITAL OUTPATIENT SURGERY
Discharge: HOME | End: 2024-09-05
Attending: STUDENT IN AN ORGANIZED HEALTH CARE EDUCATION/TRAINING PROGRAM | Admitting: STUDENT IN AN ORGANIZED HEALTH CARE EDUCATION/TRAINING PROGRAM
Payer: COMMERCIAL

## 2024-09-05 ENCOUNTER — ANESTHESIA (OUTPATIENT)
Dept: PERIOP | Facility: HOSPITAL | Age: 34
End: 2024-09-05
Payer: COMMERCIAL

## 2024-09-05 ENCOUNTER — TRANSCRIBE ORDERS (OUTPATIENT)
Dept: LAB | Facility: HOSPITAL | Age: 34
End: 2024-09-05
Payer: COMMERCIAL

## 2024-09-05 ENCOUNTER — APPOINTMENT (OUTPATIENT)
Dept: GENERAL RADIOLOGY | Facility: HOSPITAL | Age: 34
End: 2024-09-05
Payer: COMMERCIAL

## 2024-09-05 ENCOUNTER — ANESTHESIA EVENT (OUTPATIENT)
Dept: PERIOP | Facility: HOSPITAL | Age: 34
End: 2024-09-05
Payer: COMMERCIAL

## 2024-09-05 ENCOUNTER — ANCILLARY PROCEDURE (OUTPATIENT)
Dept: LAB | Facility: HOSPITAL | Age: 34
End: 2024-09-05
Payer: COMMERCIAL

## 2024-09-05 VITALS
OXYGEN SATURATION: 98 % | SYSTOLIC BLOOD PRESSURE: 106 MMHG | RESPIRATION RATE: 16 BRPM | HEART RATE: 73 BPM | TEMPERATURE: 97.7 F | DIASTOLIC BLOOD PRESSURE: 70 MMHG

## 2024-09-05 DIAGNOSIS — D24.1 BREAST FIBROADENOMA IN FEMALE, RIGHT: ICD-10-CM

## 2024-09-05 DIAGNOSIS — D24.1 FIBROADENOMA OF RIGHT BREAST IN FEMALE: Primary | ICD-10-CM

## 2024-09-05 DIAGNOSIS — D24.1 FIBROADENOMA OF RIGHT BREAST IN FEMALE: ICD-10-CM

## 2024-09-05 LAB
B-HCG UR QL: NEGATIVE
EXPIRATION DATE: NORMAL
INTERNAL NEGATIVE CONTROL: NEGATIVE
INTERNAL POSITIVE CONTROL: POSITIVE
Lab: NORMAL

## 2024-09-05 PROCEDURE — 25810000003 LACTATED RINGERS PER 1000 ML: Performed by: ANESTHESIOLOGY

## 2024-09-05 PROCEDURE — 19285 PERQ DEV BREAST 1ST US IMAG: CPT | Performed by: STUDENT IN AN ORGANIZED HEALTH CARE EDUCATION/TRAINING PROGRAM

## 2024-09-05 PROCEDURE — 76098 X-RAY EXAM SURGICAL SPECIMEN: CPT

## 2024-09-05 PROCEDURE — 81025 URINE PREGNANCY TEST: CPT | Performed by: STUDENT IN AN ORGANIZED HEALTH CARE EDUCATION/TRAINING PROGRAM

## 2024-09-05 PROCEDURE — 19125 EXCISION BREAST LESION: CPT | Performed by: STUDENT IN AN ORGANIZED HEALTH CARE EDUCATION/TRAINING PROGRAM

## 2024-09-05 PROCEDURE — 25010000002 CEFAZOLIN PER 500 MG: Performed by: STUDENT IN AN ORGANIZED HEALTH CARE EDUCATION/TRAINING PROGRAM

## 2024-09-05 PROCEDURE — 25010000002 GLYCOPYRROLATE 0.2 MG/ML SOLUTION: Performed by: NURSE ANESTHETIST, CERTIFIED REGISTERED

## 2024-09-05 PROCEDURE — 25010000002 LIDOCAINE 1 % SOLUTION 20 ML VIAL: Performed by: STUDENT IN AN ORGANIZED HEALTH CARE EDUCATION/TRAINING PROGRAM

## 2024-09-05 PROCEDURE — 25010000002 HYDROMORPHONE 1 MG/ML SOLUTION: Performed by: NURSE ANESTHETIST, CERTIFIED REGISTERED

## 2024-09-05 PROCEDURE — 88307 TISSUE EXAM BY PATHOLOGIST: CPT | Performed by: STUDENT IN AN ORGANIZED HEALTH CARE EDUCATION/TRAINING PROGRAM

## 2024-09-05 PROCEDURE — 25010000002 BUPIVACAINE (PF) 0.5 % SOLUTION 10 ML VIAL: Performed by: STUDENT IN AN ORGANIZED HEALTH CARE EDUCATION/TRAINING PROGRAM

## 2024-09-05 PROCEDURE — 25010000002 PROPOFOL 200 MG/20ML EMULSION: Performed by: NURSE ANESTHETIST, CERTIFIED REGISTERED

## 2024-09-05 RX ORDER — IPRATROPIUM BROMIDE AND ALBUTEROL SULFATE 2.5; .5 MG/3ML; MG/3ML
3 SOLUTION RESPIRATORY (INHALATION) ONCE AS NEEDED
Status: DISCONTINUED | OUTPATIENT
Start: 2024-09-05 | End: 2024-09-05 | Stop reason: HOSPADM

## 2024-09-05 RX ORDER — DROPERIDOL 2.5 MG/ML
0.62 INJECTION, SOLUTION INTRAMUSCULAR; INTRAVENOUS
Status: DISCONTINUED | OUTPATIENT
Start: 2024-09-05 | End: 2024-09-05 | Stop reason: HOSPADM

## 2024-09-05 RX ORDER — LIDOCAINE HYDROCHLORIDE 20 MG/ML
INJECTION, SOLUTION INFILTRATION; PERINEURAL AS NEEDED
Status: DISCONTINUED | OUTPATIENT
Start: 2024-09-05 | End: 2024-09-05 | Stop reason: SURG

## 2024-09-05 RX ORDER — SODIUM CHLORIDE 9 MG/ML
40 INJECTION, SOLUTION INTRAVENOUS AS NEEDED
Status: DISCONTINUED | OUTPATIENT
Start: 2024-09-05 | End: 2024-09-05 | Stop reason: HOSPADM

## 2024-09-05 RX ORDER — DEXMEDETOMIDINE HYDROCHLORIDE 100 UG/ML
INJECTION, SOLUTION INTRAVENOUS AS NEEDED
Status: DISCONTINUED | OUTPATIENT
Start: 2024-09-05 | End: 2024-09-05 | Stop reason: SURG

## 2024-09-05 RX ORDER — MIDAZOLAM HYDROCHLORIDE 1 MG/ML
1 INJECTION INTRAMUSCULAR; INTRAVENOUS
Status: DISCONTINUED | OUTPATIENT
Start: 2024-09-05 | End: 2024-09-05 | Stop reason: HOSPADM

## 2024-09-05 RX ORDER — SODIUM CHLORIDE 0.9 % (FLUSH) 0.9 %
10 SYRINGE (ML) INJECTION AS NEEDED
Status: DISCONTINUED | OUTPATIENT
Start: 2024-09-05 | End: 2024-09-05 | Stop reason: HOSPADM

## 2024-09-05 RX ORDER — SODIUM CHLORIDE, SODIUM LACTATE, POTASSIUM CHLORIDE, CALCIUM CHLORIDE 600; 310; 30; 20 MG/100ML; MG/100ML; MG/100ML; MG/100ML
9 INJECTION, SOLUTION INTRAVENOUS CONTINUOUS PRN
Status: DISCONTINUED | OUTPATIENT
Start: 2024-09-05 | End: 2024-09-05 | Stop reason: HOSPADM

## 2024-09-05 RX ORDER — PROMETHAZINE HYDROCHLORIDE 25 MG/1
25 TABLET ORAL ONCE AS NEEDED
Status: DISCONTINUED | OUTPATIENT
Start: 2024-09-05 | End: 2024-09-05 | Stop reason: HOSPADM

## 2024-09-05 RX ORDER — NALOXONE HCL 0.4 MG/ML
0.2 VIAL (ML) INJECTION AS NEEDED
Status: DISCONTINUED | OUTPATIENT
Start: 2024-09-05 | End: 2024-09-05 | Stop reason: HOSPADM

## 2024-09-05 RX ORDER — OXYCODONE AND ACETAMINOPHEN 7.5; 325 MG/1; MG/1
1 TABLET ORAL EVERY 4 HOURS PRN
Status: DISCONTINUED | OUTPATIENT
Start: 2024-09-05 | End: 2024-09-05 | Stop reason: HOSPADM

## 2024-09-05 RX ORDER — ONDANSETRON 2 MG/ML
4 INJECTION INTRAMUSCULAR; INTRAVENOUS ONCE AS NEEDED
Status: DISCONTINUED | OUTPATIENT
Start: 2024-09-05 | End: 2024-09-05 | Stop reason: HOSPADM

## 2024-09-05 RX ORDER — GLYCOPYRROLATE 0.2 MG/ML
INJECTION INTRAMUSCULAR; INTRAVENOUS AS NEEDED
Status: DISCONTINUED | OUTPATIENT
Start: 2024-09-05 | End: 2024-09-05 | Stop reason: SURG

## 2024-09-05 RX ORDER — FLUMAZENIL 0.1 MG/ML
0.2 INJECTION INTRAVENOUS AS NEEDED
Status: DISCONTINUED | OUTPATIENT
Start: 2024-09-05 | End: 2024-09-05 | Stop reason: HOSPADM

## 2024-09-05 RX ORDER — LABETALOL HYDROCHLORIDE 5 MG/ML
5 INJECTION, SOLUTION INTRAVENOUS
Status: DISCONTINUED | OUTPATIENT
Start: 2024-09-05 | End: 2024-09-05 | Stop reason: HOSPADM

## 2024-09-05 RX ORDER — HYDROMORPHONE HYDROCHLORIDE 1 MG/ML
0.5 INJECTION, SOLUTION INTRAMUSCULAR; INTRAVENOUS; SUBCUTANEOUS
Status: DISCONTINUED | OUTPATIENT
Start: 2024-09-05 | End: 2024-09-05 | Stop reason: HOSPADM

## 2024-09-05 RX ORDER — SODIUM CHLORIDE 0.9 % (FLUSH) 0.9 %
10 SYRINGE (ML) INJECTION EVERY 12 HOURS SCHEDULED
Status: DISCONTINUED | OUTPATIENT
Start: 2024-09-05 | End: 2024-09-05 | Stop reason: HOSPADM

## 2024-09-05 RX ORDER — PROMETHAZINE HYDROCHLORIDE 25 MG/1
25 SUPPOSITORY RECTAL ONCE AS NEEDED
Status: DISCONTINUED | OUTPATIENT
Start: 2024-09-05 | End: 2024-09-05 | Stop reason: HOSPADM

## 2024-09-05 RX ORDER — EPHEDRINE SULFATE 50 MG/ML
5 INJECTION, SOLUTION INTRAVENOUS ONCE AS NEEDED
Status: DISCONTINUED | OUTPATIENT
Start: 2024-09-05 | End: 2024-09-05 | Stop reason: HOSPADM

## 2024-09-05 RX ORDER — DIPHENHYDRAMINE HYDROCHLORIDE 50 MG/ML
12.5 INJECTION INTRAMUSCULAR; INTRAVENOUS
Status: DISCONTINUED | OUTPATIENT
Start: 2024-09-05 | End: 2024-09-05 | Stop reason: HOSPADM

## 2024-09-05 RX ORDER — HYDROCODONE BITARTRATE AND ACETAMINOPHEN 5; 325 MG/1; MG/1
1 TABLET ORAL ONCE AS NEEDED
Status: DISCONTINUED | OUTPATIENT
Start: 2024-09-05 | End: 2024-09-05 | Stop reason: HOSPADM

## 2024-09-05 RX ORDER — FENTANYL CITRATE 50 UG/ML
50 INJECTION, SOLUTION INTRAMUSCULAR; INTRAVENOUS
Status: DISCONTINUED | OUTPATIENT
Start: 2024-09-05 | End: 2024-09-05 | Stop reason: HOSPADM

## 2024-09-05 RX ORDER — PROPOFOL 10 MG/ML
INJECTION, EMULSION INTRAVENOUS CONTINUOUS PRN
Status: DISCONTINUED | OUTPATIENT
Start: 2024-09-05 | End: 2024-09-05 | Stop reason: SURG

## 2024-09-05 RX ORDER — HYDRALAZINE HYDROCHLORIDE 20 MG/ML
5 INJECTION INTRAMUSCULAR; INTRAVENOUS
Status: DISCONTINUED | OUTPATIENT
Start: 2024-09-05 | End: 2024-09-05 | Stop reason: HOSPADM

## 2024-09-05 RX ADMIN — GLYCOPYRROLATE 0.2 MG: 0.2 INJECTION INTRAMUSCULAR; INTRAVENOUS at 08:06

## 2024-09-05 RX ADMIN — SODIUM CHLORIDE, POTASSIUM CHLORIDE, SODIUM LACTATE AND CALCIUM CHLORIDE: 600; 310; 30; 20 INJECTION, SOLUTION INTRAVENOUS at 08:06

## 2024-09-05 RX ADMIN — DEXMEDETOMIDINE HYDROCHLORIDE 10 MCG: 100 INJECTION, SOLUTION, CONCENTRATE INTRAVENOUS at 08:08

## 2024-09-05 RX ADMIN — PROPOFOL 50 MG: 10 INJECTION, EMULSION INTRAVENOUS at 08:06

## 2024-09-05 RX ADMIN — DEXMEDETOMIDINE HYDROCHLORIDE 10 MCG: 100 INJECTION, SOLUTION, CONCENTRATE INTRAVENOUS at 08:06

## 2024-09-05 RX ADMIN — PROPOFOL 160 MCG/KG/MIN: 10 INJECTION, EMULSION INTRAVENOUS at 08:08

## 2024-09-05 RX ADMIN — SODIUM CHLORIDE 2000 MG: 900 INJECTION INTRAVENOUS at 07:55

## 2024-09-05 RX ADMIN — HYDROMORPHONE HYDROCHLORIDE 0.5 MG: 1 INJECTION, SOLUTION INTRAMUSCULAR; INTRAVENOUS; SUBCUTANEOUS at 08:06

## 2024-09-05 RX ADMIN — GLYCOPYRROLATE 0.4 MG: 0.2 INJECTION INTRAMUSCULAR; INTRAVENOUS at 08:36

## 2024-09-05 RX ADMIN — LIDOCAINE HYDROCHLORIDE 100 MG: 20 INJECTION, SOLUTION INFILTRATION; PERINEURAL at 08:06

## 2024-09-05 NOTE — OP NOTE
BREAST BIOPSY  Procedure Report    Patient Name:  Liana Perez  YOB: 1990    Date of Surgery:  9/5/2024       Pre-op Diagnosis:   Breast fibroadenoma in female, right [D24.1]       Post-Op Diagnosis Codes:     * Breast fibroadenoma in female, right [D24.1]      Procedure(s):  Right breast excisional biopsy, intraoperative wire placement        Staff:  Surgeon(s):  Zenaida Mireles MD         Anesthesia: Monitored Anesthesia Care    Estimated Blood Loss: minimal    Implants:    Nothing was implanted during the procedure    Specimen:          Specimens       ID Source Type Tests Collected By Collected At Frozen?    A Breast, Right Tissue TISSUE PATHOLOGY EXAM   Zenaida Mireles MD 9/5/24 0846 No    Description: Right breast excisional biopsy, short stitch superior, long stitch lateral                Findings: clip and wire in excision    Complications: none    Description of Procedure:   The risks and benefits of the procedure were explained in detail to the patient.  Informed consent was obtained.   The patient was then taken to the operating room and placed supine on the operating room table.  Sequential compression devices were applied to the lower extremities and preoperative antibiotics administered. A time out was then performed to identify the proper patient, procedure, and location.     After the administration of adequate anesthesia, the right breast was prepped and draped in the standard surgical fashion. Using ultrasound guidance the previously biopsied mass was visualized and a homer wire was advanced into the lesion. Next local anesthetic comprised of a 50-50 mixture of 1% lidocaine with epinephrine and half percent Marcaine was infiltrated in the planned area of incision and a periareolar incision was then made on the right breast.  Dissection was carried through the skin to the underlying subcutaneous tissues.  Sharp dissection with the use of electrocautery was utilized to dissect down  to the level of the mass.  The region was dissected free from the rest of the breast and removed, care taken to ensure that the wire was intact and the lesion in question was positioned near the center of the resected specimen.  The specimen was then marked short stitch superiorly, long stitch laterally.  Intraoperative specimen radiograph then confirmed that the lesion and the previously placed biopsy clip were within the specimen.  Hemostasis ensured.  The underlying breast tissue was re-approximated with a 2-0 Vicryl suture.  The incision was then closed in two layers with absorbable suture (3-0 vicryl deep dermal and 4-0 monocryl subcuticular) and Dermabond applied.  A compression dressing was placed over her right breast.  The patient tolerated the procedure well and was transported to the post anesthesia care unit in stable condition.               Zenaida Mireles MD     Date: 9/5/2024  Time: 15:06 EDT

## 2024-09-05 NOTE — ANESTHESIA POSTPROCEDURE EVALUATION
Patient: Liana Perez    Procedure Summary       Date: 09/05/24 Room / Location: [facility] OR [address] [place] [address] MAIN OR    Anesthesia Start: 0803 Anesthesia Stop: 0919    Procedure: Right breast excisional biopsy, intraoperative wire placement (Right: Breast) Diagnosis:       Breast fibroadenoma in female, right      (Breast fibroadenoma in female, right [D24.1])    Surgeons: Zenaida Mireles MD Provider: Puma Muir MD    Anesthesia Type: MAC ASA Status: 2            Anesthesia Type: MAC    Vitals  Vitals Value Taken Time   /70 09/05/24 0945   Temp 36.5 °C (97.7 °F) 09/05/24 0918   Pulse 70 09/05/24 0947   Resp 16 09/05/24 0930   SpO2 97 % 09/05/24 0947   Vitals shown include unfiled device data.        Post Anesthesia Care and Evaluation    Patient location during evaluation: PHASE II  Level of consciousness: awake  Pain management: adequate    Airway patency: patent  Anesthetic complications: No anesthetic complications  PONV Status: controlled  Cardiovascular status: acceptable  Respiratory status: acceptable  Hydration status: acceptable    Comments: /69   Pulse 78   Temp 36.5 °C (97.7 °F) (Oral)   Resp 16   SpO2 97%

## 2024-09-05 NOTE — INTERVAL H&P NOTE
H&P reviewed. The patient was examined and there are no changes to the H&P.      Preop evaluation completed with video  Alexis Read

## 2024-09-05 NOTE — ANESTHESIA PREPROCEDURE EVALUATION
Anesthesia Evaluation     Patient summary reviewed and Nursing notes reviewed   NPO Solid Status: > 8 hours  NPO Liquid Status: > 2 hours           Airway   Mallampati: II  TM distance: >3 FB  Neck ROM: full  No difficulty expected  Dental - normal exam     Pulmonary - negative pulmonary ROS and normal exam   Cardiovascular - negative cardio ROS and normal exam  Exercise tolerance: good (4-7 METS)        Neuro/Psych- negative ROS  GI/Hepatic/Renal/Endo - negative ROS     Musculoskeletal (-) negative ROS    Abdominal    Substance History - negative use     OB/GYN negative ob/gyn ROS         Other                    Anesthesia Plan    ASA 2     MAC     intravenous induction     Anesthetic plan, risks, benefits, and alternatives have been provided, discussed and informed consent has been obtained with: patient.    CODE STATUS:

## 2024-09-06 LAB
CYTO UR: NORMAL
LAB AP CASE REPORT: NORMAL
LAB AP DIAGNOSIS COMMENT: NORMAL
PATH REPORT.FINAL DX SPEC: NORMAL
PATH REPORT.GROSS SPEC: NORMAL

## 2024-09-20 ENCOUNTER — OFFICE VISIT (OUTPATIENT)
Dept: SURGERY | Facility: CLINIC | Age: 34
End: 2024-09-20
Payer: COMMERCIAL

## 2024-09-20 VITALS
HEART RATE: 72 BPM | SYSTOLIC BLOOD PRESSURE: 126 MMHG | WEIGHT: 194 LBS | BODY MASS INDEX: 27.16 KG/M2 | OXYGEN SATURATION: 98 % | DIASTOLIC BLOOD PRESSURE: 82 MMHG | RESPIRATION RATE: 18 BRPM | HEIGHT: 71 IN

## 2024-09-20 DIAGNOSIS — D24.1 BREAST FIBROADENOMA IN FEMALE, RIGHT: Primary | ICD-10-CM

## 2024-09-20 PROCEDURE — 99024 POSTOP FOLLOW-UP VISIT: CPT | Performed by: STUDENT IN AN ORGANIZED HEALTH CARE EDUCATION/TRAINING PROGRAM

## 2024-10-23 ENCOUNTER — TELEPHONE (OUTPATIENT)
Dept: OBSTETRICS AND GYNECOLOGY | Facility: CLINIC | Age: 34
End: 2024-10-23
Payer: COMMERCIAL

## 2024-10-23 NOTE — TELEPHONE ENCOUNTER
Please see HUB message.    Pt states she has bartholin cyst, how soon would you like for pt to be scheduled? Please advise, thank you

## 2024-10-23 NOTE — TELEPHONE ENCOUNTER
LION MALLORY      [phone]    PT HAS A BARTHOLIN GLAND AND WOULD LIKE  TO BE SEEN ASAP (NOTHING AVAILABLE UNTIL 11/13/24) AND KNOWS SHE WANTS SURGERY

## 2025-01-03 ENCOUNTER — TELEPHONE (OUTPATIENT)
Dept: OBSTETRICS AND GYNECOLOGY | Facility: CLINIC | Age: 35
End: 2025-01-03
Payer: COMMERCIAL

## 2025-01-07 DIAGNOSIS — Z12.31 SCREENING MAMMOGRAM FOR BREAST CANCER: Primary | ICD-10-CM

## 2025-02-03 ENCOUNTER — APPOINTMENT (OUTPATIENT)
Dept: WOMENS IMAGING | Facility: HOSPITAL | Age: 35
End: 2025-02-03
Payer: COMMERCIAL

## 2025-02-03 PROCEDURE — 77067 SCR MAMMO BI INCL CAD: CPT | Performed by: RADIOLOGY

## 2025-02-03 PROCEDURE — 77063 BREAST TOMOSYNTHESIS BI: CPT | Performed by: RADIOLOGY

## (undated) DEVICE — PAD,ABDOMINAL,8"X10",ST,LF: Brand: MEDLINE

## (undated) DEVICE — PK CHST BRST 40

## (undated) DEVICE — CVR PROB GEN PURP W ISOSILK 6X48

## (undated) DEVICE — INTENDED FOR TISSUE SEPARATION, AND OTHER PROCEDURES THAT REQUIRE A SHARP SURGICAL BLADE TO PUNCTURE OR CUT.: Brand: BARD-PARKER ® CARBON RIB-BACK BLADES

## (undated) DEVICE — BNDG,ELSTC,MATRIX,STRL,6"X5YD,LF,HOOK&LP: Brand: MEDLINE

## (undated) DEVICE — TRAP FLD MINIVAC MEGADYNE 100ML

## (undated) DEVICE — ANTIBACTERIAL VIOLET BRAIDED (POLYGLACTIN 910), SYNTHETIC ABSORBABLE SUTURE: Brand: COATED VICRYL

## (undated) DEVICE — BANDAGE,GAUZE,BULKEE II,4.5"X4.1YD,STRL: Brand: MEDLINE

## (undated) DEVICE — SYR LL TP 10ML STRL

## (undated) DEVICE — ANTIBACTERIAL UNDYED BRAIDED (POLYGLACTIN 910), SYNTHETIC ABSORBABLE SUTURE: Brand: COATED VICRYL

## (undated) DEVICE — ELECTRD BLD EZ CLN MOD XLNG 2.75IN

## (undated) DEVICE — SUT ANTIBAC VICRYL/PLS ABS TIE COAT SZ3/0 12X18IN UD

## (undated) DEVICE — SUT MNCRYL PLS ANTIB UD 4/0 PS2 18IN

## (undated) DEVICE — STPLR SKIN VISISTAT WD 35CT

## (undated) DEVICE — CVR PROB 96IN LF STRL

## (undated) DEVICE — NDL HYPO PRECISIONGLIDE REG 25G 1 1/2

## (undated) DEVICE — GLV SURG SENSICARE PI LF PF 7.5 GRN STRL

## (undated) DEVICE — SUT SILK 2/0 SH 30IN K833H

## (undated) DEVICE — LEGGINGS, PAIR, 31X48, STERILE: Brand: MEDLINE

## (undated) DEVICE — APPL CHLORAPREP HI/LITE 26ML ORNG

## (undated) DEVICE — GLV SURG BIOGEL LTX PF 7